# Patient Record
Sex: FEMALE | Race: BLACK OR AFRICAN AMERICAN | Employment: UNEMPLOYED | URBAN - METROPOLITAN AREA
[De-identification: names, ages, dates, MRNs, and addresses within clinical notes are randomized per-mention and may not be internally consistent; named-entity substitution may affect disease eponyms.]

---

## 2019-05-19 ENCOUNTER — HOSPITAL ENCOUNTER (EMERGENCY)
Age: 19
Discharge: HOME | End: 2019-05-19
Attending: EMERGENCY MEDICINE

## 2019-05-19 VITALS
HEART RATE: 91 BPM | OXYGEN SATURATION: 99 % | RESPIRATION RATE: 18 BRPM | BODY MASS INDEX: 47.8 KG/M2 | TEMPERATURE: 98.4 F | DIASTOLIC BLOOD PRESSURE: 89 MMHG | WEIGHT: 280 LBS | HEIGHT: 64 IN | SYSTOLIC BLOOD PRESSURE: 139 MMHG

## 2019-05-19 DIAGNOSIS — L03.119 CELLULITIS OF LOWER EXTREMITY, UNSPECIFIED LATERALITY: Primary | ICD-10-CM

## 2019-05-19 PROCEDURE — 99282 EMERGENCY DEPT VISIT SF MDM: CPT

## 2019-05-19 RX ORDER — CEFADROXIL 500 MG/1
500 CAPSULE ORAL 2 TIMES DAILY
Qty: 14 CAPSULE | Refills: 0 | Status: SHIPPED | OUTPATIENT
Start: 2019-05-19 | End: 2019-05-26

## 2019-05-19 ASSESSMENT — PAIN DESCRIPTION - LOCATION: LOCATION: LEG

## 2019-05-19 ASSESSMENT — ENCOUNTER SYMPTOMS
BACK PAIN: 0
NAUSEA: 0
ABDOMINAL PAIN: 0
VOMITING: 0
RHINORRHEA: 0
COUGH: 0
DIARRHEA: 0
SHORTNESS OF BREATH: 0

## 2019-05-19 ASSESSMENT — PAIN SCALES - GENERAL: PAINLEVEL_OUTOF10: 4

## 2019-05-19 ASSESSMENT — PAIN DESCRIPTION - PAIN TYPE: TYPE: ACUTE PAIN

## 2019-05-19 ASSESSMENT — PAIN DESCRIPTION - ORIENTATION: ORIENTATION: RIGHT;LEFT;ANTERIOR;LOWER

## 2019-05-19 NOTE — ED PROVIDER NOTES
[address] ENCOUNTER          ATTENDING PHYSICIAN NOTE     Date of evaluation: 5/19/2019    Chief Complaint     Leg Pain      History of Present Illness     Kirk Posey is a 22 y.o. female with a history of chronic B shin wounds who presents with increased pain/redness around her wounds. Onset several days ago. Reports extensive work-up including biopsy in past without definitive diagnosis. Denies fever or other complaints. Denies any trauma. Review of Systems     Review of Systems   Constitutional: Negative for chills and fever. HENT: Negative for congestion and rhinorrhea. Respiratory: Negative for cough and shortness of breath. Cardiovascular: Negative for chest pain and palpitations. Gastrointestinal: Negative for abdominal pain, diarrhea, nausea and vomiting. Genitourinary: Negative for dysuria, frequency and urgency. Musculoskeletal: Negative for back pain and neck pain. Skin: Positive for rash and wound. Neurological: Negative for syncope and headaches. Psychiatric/Behavioral: Negative for agitation and confusion. Past Medical, Surgical, Family, and Social History     She has a past medical history of Cellulitis. She has no past surgical history on file. Her family history is not on file. She reports that she has never smoked. She does not have any smokeless tobacco history on file. She reports that she drinks alcohol. She reports that she has current or past drug history. Drug: Marijuana. Medications     Discharge Medication List as of 5/19/2019  3:34 PM          Allergies     Shehas no allergies on file. Physical Exam     INITIAL VITALS: BP: 139/89,Temp: 98.4 °F (36.9 °C), Pulse: 91, Resp: 18, SpO2: 99 %   Physical Exam   Constitutional: She is oriented to person, place, and time. She appears well-developed and well-nourished. No distress. HENT:   Head: Normocephalic and atraumatic. Eyes: EOM are normal. No scleral icterus. Neck: Normal range of motion. No tracheal deviation present. Cardiovascular: Normal rate, regular rhythm and intact distal pulses. Pulmonary/Chest: Effort normal. No respiratory distress. Musculoskeletal: Normal range of motion. She exhibits no edema. B shin scars/?eschars with minimal R>L erythema  No fluctuance  Easily palpable DP/PT pulses   Neurological: She is alert and oriented to person, place, and time. No cranial nerve deficit. Skin: Skin is warm. No rash noted. She is not diaphoretic. Psychiatric: She has a normal mood and affect. Her behavior is normal.   Nursing note and vitals reviewed. RLE      LLE    Diagnostic Results     EKG   N/A    RADIOLOGY:  No orders to display       LABS:   No results found for this visit on 05/19/19. ED BEDSIDE ULTRASOUND:  N/A    RECENT VITALS:  BP: 139/89, Temp: 98.4 °F (36.9 °C), Pulse: 91, Resp: 18, SpO2: 99 %     Procedures     N/A    MEDICAL DECISION MAKING / ASSESSMENT / Bárbara Tito is a 22 y.o. female with a history of chronic B shin wounds who presents with increased pain/redness around her wounds. Symptoms and exam most consistent with venous stasis dermatitis with chronic wounds. New erythema could be cellulitis but fairly atypical. Doubt anthrax. Doubt other emergent condition. Plan: No indication for labs or imaging, empiric cefadroxil, derm follow-up    ED Course     Nursing Notes, Past Medical Hx, Past Surgical Hx, Social Hx, Allergies, and Family Hx were reviewed. The patient was given the followingmedications:  Orders Placed This Encounter   Medications    cefadroxil (DURICEF) 500 MG capsule     Sig: Take 1 capsule by mouth 2 times daily for 7 days     Dispense:  14 capsule     Refill:  0       CONSULTS:  None    ED Course as of May 19 1614   Sun May 19, 2019   1515 Patient remains well-appearing. Suspect possible cellulitis on chronic wounds. Will discharge with cefadroxil and derm follow-up.  Verbal and written discharge instructions given to and understood by patient.    [facility]      ED Course User Index  [AZ] Susana Duval MD       Clinical Impression     1.  Cellulitis of lower extremity, unspecified laterality        Disposition     PATIENT REFERRED TO:  Your dermatologist    Schedule an appointment as soon as possible for a visit in 1 week      [facility] Emergency Department  [address] [place] [address]  [phone]  Go to   If symptoms worsen      DISCHARGEMEDICATIONS:  Discharge Medication List as of 5/19/2019  3:34 PM      START taking these medications    Details   cefadroxil (DURICEF) 500 MG capsule Take 1 capsule by mouth 2 times daily for 7 days, Disp-14 capsule, R-0Print             DISPOSITION Decision To Discharge 05/19/2019 03:15:44 PM      Susana Duval MD  05/19/19 0468
